# Patient Record
Sex: MALE | Race: WHITE | NOT HISPANIC OR LATINO | Employment: UNEMPLOYED | URBAN - METROPOLITAN AREA
[De-identification: names, ages, dates, MRNs, and addresses within clinical notes are randomized per-mention and may not be internally consistent; named-entity substitution may affect disease eponyms.]

---
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
## 2022-06-06 ENCOUNTER — MEDICAL CORRESPONDENCE (OUTPATIENT)
Dept: HEALTH INFORMATION MANAGEMENT | Facility: CLINIC | Age: 22
End: 2022-06-06
Payer: OTHER GOVERNMENT

## 2022-06-07 ENCOUNTER — TELEPHONE (OUTPATIENT)
Dept: ORTHOPEDICS | Facility: CLINIC | Age: 22
End: 2022-06-07
Payer: OTHER GOVERNMENT

## 2022-06-07 NOTE — TELEPHONE ENCOUNTER
06/07/2022 .. MOM / HOLLY called asking for Doctor to call Carlos not Dad at [phone] ..Referring to Note at bottom     Reason for call: Father called requesting an appointment with provider sooner than Thursday. Also requesting MRI for possible knee injury. Patient is leaving for [place] on Sunday and they are hoping to expedite.    Home number on file [phone] (home)

## 2022-06-07 NOTE — TELEPHONE ENCOUNTER
Spoke with patient's dad. Advised an MRI order can't be placed until he's been evaluated. Patient's dad requested sooner appointment. Dr. Chappell's schedule is full so main scheduling number was provided so patient can see if there is sooner availability with another provider.    Sarita Cates MSA, ATC  Certified Athletic Trainer

## 2022-06-07 NOTE — TELEPHONE ENCOUNTER
[facility] Call Center    Phone Message    May a detailed message be left on voicemail: yes     Reason for Call: Other: Pt requesting MRI leaves on Sunday to go back to [place] if not able to get MRI will just go back to [place] early     Action Taken: Other: ortho     Travel Screening: Not Applicable

## 2022-06-08 ENCOUNTER — OFFICE VISIT (OUTPATIENT)
Dept: ORTHOPEDICS | Facility: CLINIC | Age: 22
End: 2022-06-08
Payer: OTHER GOVERNMENT

## 2022-06-08 VITALS — BODY MASS INDEX: 25.2 KG/M2 | WEIGHT: 180 LBS | HEIGHT: 71 IN

## 2022-06-08 DIAGNOSIS — M25.562 ACUTE PAIN OF LEFT KNEE: Primary | ICD-10-CM

## 2022-06-08 PROCEDURE — 99203 OFFICE O/P NEW LOW 30 MIN: CPT | Performed by: FAMILY MEDICINE

## 2022-06-08 RX ORDER — METHYLPREDNISOLONE 4 MG
4 TABLET, DOSE PACK ORAL SEE ADMIN INSTRUCTIONS
COMMUNITY

## 2022-06-08 NOTE — PROGRESS NOTES
Subjective:   Juan Carlos Ricci is a 21 year old male who presents in clinic right knee pain. Patient reports that pain is over medial patellofemoral region. He reports he felt multiple pops with injury. He has continued to have pain, swelling and instability.   Playing bball, going for lay-up and planted to go up. Fast and didn't feel instability.  Tried to walk off right medial knee pain. Thought good to go, 2 pops on inner knee and fell down.  Walking back to car and on verge of popping.  Rested the knee.  First time injury like this.    Patient was seen at [facility] in [place] on 6/5/2022 for this injury. He had an XR and was referred to Ortho for an MRI. He was prescribed a medrol pack at that appointment. Right knee XR is in PACS.     Patient is scheduled to leave for [place] on Sunday 6/12/2022. Summer training starting, going into [employer] year.    Background:   Date of injury: 6/4/2022   Duration of symptoms: 4 days  Mechanism of Injury: Acute; Sports Related, reports playing basketball with friends and went up for a layup, he planted with his right knee and felt a pop and pain.   Intensity: 1/10 at rest, 5/10 with activity   Aggravating factors: lateral movements and full extension or flexion.   Relieving Factors: rest, ice, elevation, wrap around knee brace, crutches, compression and medrol pack. Patient reports that medrol pack is helping with decreasing his swelling and ultimately his ROM.   Prior Evaluation: None    PAST MEDICAL, SOCIAL, SURGICAL AND FAMILY HISTORY: He  has no past medical history on file.  He  has no past surgical history on file.  His family history is not on file.  He reports that he has never smoked. He does not have any smokeless tobacco history on file. He reports that he does not drink alcohol.    ALLERGIES: He is allergic to penicillins.    CURRENT MEDICATIONS: He has a current medication list which includes the following prescription(s): methylprednisolone.     REVIEW  "OF SYSTEMS: 10 point review of systems is negative except as noted above.     Exam:   Ht 1.803 m (5' 11\")   Wt 81.6 kg (180 lb)   BMI 25.10 kg/m             CONSTITUTIONAL: healthy, alert, mild distress and cooperative  HEAD: Normocephalic. No masses, lesions, tenderness or abnormalities  SKIN: no suspicious lesions or rashes  GAIT: antalgic and unable to fully extend right knee  NEUROLOGIC: Non-focal  PSYCHIATRIC: affect normal/bright and mentation appears normal.    MUSCULOSKELETAL: right knee pain      Inspection:       AP/lateral alignment normal  Tender: medial knee, MCL, patella      Non-tender: patellar facets, LCL, lateral joint line, medial joint line, IT band, posterior knee   Active Range of Motion: lacks full extension  Strength: quad  4/5, Hamstrings  5/5  Special tests: normal Valgus stress test, normal Varus, negative Lachman's test, negative Theresa's, no apprehension with lateral stress of the patella.         Assessment/Plan:   Patient is a 21-year-old [employer] presenting with a right knee injury  1. Right medial knee injury-  Concern patella tracking, possible MCL sprain  Has crutches, on medrol dose pack, brace  Pt needs to be at [place] this weekend  MRI knee if insurance allows    RTC - will call with results  X-RAY INTERPRETATION:   X-Ray of the Right Knee: 3-view, Harp, lateral, sunrise   ordered and interpreted in the office today was negative for fracture, subluxation or joint space abnormality.  "

## 2022-06-08 NOTE — LETTER
6/8/2022      RE: Juan Carlos Ricci  [address]  [place] [address]     Dear Colleague,    Thank you for referring your patient, Juan Carlos Ricci, to the [facility] [place]. Please see a copy of my visit note below.     Subjective:   Juan Carlos Ricci is a 21 year old male who presents in clinic right knee pain. Patient reports that pain is over medial patellofemoral region. He reports he felt multiple pops with injury. He has continued to have pain, swelling and instability.   Playing bball, going for lay-up and planted to go up. Fast and didn't feel instability.  Tried to walk off right medial knee pain. Thought good to go, 2 pops on inner knee and fell down.  Walking back to car and on verge of popping.  Rested the knee.  First time injury like this.    Patient was seen at [facility] in [place] on 6/5/2022 for this injury. He had an XR and was referred to Ortho for an MRI. He was prescribed a medrol pack at that appointment. Right knee XR is in PACS.     Patient is scheduled to leave for [place] on Sunday 6/12/2022. Summer training starting, going into [employer] year.    Background:   Date of injury: 6/4/2022   Duration of symptoms: 4 days  Mechanism of Injury: Acute; Sports Related, reports playing basketball with friends and went up for a layup, he planted with his right knee and felt a pop and pain.   Intensity: 1/10 at rest, 5/10 with activity   Aggravating factors: lateral movements and full extension or flexion.   Relieving Factors: rest, ice, elevation, wrap around knee brace, crutches, compression and medrol pack. Patient reports that medrol pack is helping with decreasing his swelling and ultimately his ROM.   Prior Evaluation: None    PAST MEDICAL, SOCIAL, SURGICAL AND FAMILY HISTORY: He  has no past medical history on file.  He  has no past surgical history on file.  His family history is not on file.  He reports that he has never smoked.  "He does not have any smokeless tobacco history on file. He reports that he does not drink alcohol.    ALLERGIES: He is allergic to penicillins.    CURRENT MEDICATIONS: He has a current medication list which includes the following prescription(s): methylprednisolone.     REVIEW OF SYSTEMS: 10 point review of systems is negative except as noted above.     Exam:   Ht 1.803 m (5' 11\")   Wt 81.6 kg (180 lb)   BMI 25.10 kg/m             CONSTITUTIONAL: healthy, alert, mild distress and cooperative  HEAD: Normocephalic. No masses, lesions, tenderness or abnormalities  SKIN: no suspicious lesions or rashes  GAIT: antalgic and unable to fully extend right knee  NEUROLOGIC: Non-focal  PSYCHIATRIC: affect normal/bright and mentation appears normal.    MUSCULOSKELETAL: right knee pain      Inspection:       AP/lateral alignment normal  Tender: medial knee, MCL, patella      Non-tender: patellar facets, LCL, lateral joint line, medial joint line, IT band, posterior knee   Active Range of Motion: lacks full extension  Strength: quad  4/5, Hamstrings  5/5  Special tests: normal Valgus stress test, normal Varus, negative Lachman's test, negative Theresa's, no apprehension with lateral stress of the patella.     Assessment/Plan:   Patient is a 21-year-old [employer] presenting with a right knee injury  1. Right medial knee injury-  Concern patella tracking, possible MCL sprain  Has crutches, on medrol dose pack, brace  Pt needs to be at [place] this weekend  MRI knee if insurance allows    RTC - will call with results  X-RAY INTERPRETATION:   X-Ray of the Right Knee: 3-view, Harp, lateral, sunrise   ordered and interpreted in the office today was negative for fracture, subluxation or joint space abnormality.      Again, thank you for allowing me to participate in the care of your patient.      Sincerely,    Sammie Sam MD  "